# Patient Record
Sex: FEMALE | Race: WHITE | NOT HISPANIC OR LATINO | URBAN - METROPOLITAN AREA
[De-identification: names, ages, dates, MRNs, and addresses within clinical notes are randomized per-mention and may not be internally consistent; named-entity substitution may affect disease eponyms.]

---

## 2021-12-28 ENCOUNTER — OFFICE VISIT (OUTPATIENT)
Dept: URBAN - METROPOLITAN AREA CLINIC 96 | Facility: CLINIC | Age: 56
End: 2021-12-28
Payer: COMMERCIAL

## 2021-12-28 ENCOUNTER — DASHBOARD ENCOUNTERS (OUTPATIENT)
Age: 56
End: 2021-12-28

## 2021-12-28 ENCOUNTER — WEB ENCOUNTER (OUTPATIENT)
Dept: URBAN - METROPOLITAN AREA CLINIC 96 | Facility: CLINIC | Age: 56
End: 2021-12-28

## 2021-12-28 VITALS
BODY MASS INDEX: 22.79 KG/M2 | HEIGHT: 65 IN | SYSTOLIC BLOOD PRESSURE: 128 MMHG | HEART RATE: 62 BPM | WEIGHT: 136.8 LBS | TEMPERATURE: 96 F | DIASTOLIC BLOOD PRESSURE: 87 MMHG

## 2021-12-28 DIAGNOSIS — R19.8 GLOBUS SENSATION: ICD-10-CM

## 2021-12-28 DIAGNOSIS — K44.9 HIATAL HERNIA: ICD-10-CM

## 2021-12-28 DIAGNOSIS — K21.9 GASTROESOPHAGEAL REFLUX DISEASE, UNSPECIFIED WHETHER ESOPHAGITIS PRESENT: ICD-10-CM

## 2021-12-28 PROBLEM — 235595009: Status: ACTIVE | Noted: 2021-12-28

## 2021-12-28 PROCEDURE — 99244 OFF/OP CNSLTJ NEW/EST MOD 40: CPT | Performed by: INTERNAL MEDICINE

## 2021-12-28 PROCEDURE — 99204 OFFICE O/P NEW MOD 45 MIN: CPT | Performed by: INTERNAL MEDICINE

## 2021-12-28 RX ORDER — PANTOPRAZOLE SODIUM 40 MG/1
1 TABLET TABLET, DELAYED RELEASE ORAL ONCE A DAY
Status: ACTIVE | COMMUNITY

## 2021-12-28 NOTE — HPI-TODAY'S VISIT:
Patient was previously seen for screening colonoscopy performed remotely by myself 12/13/2018 with 3 mm polyp noted in the rectum, few small mouth diverticuli in the sigmoid.  Pathology consistent with hyperplastic polyp. Repeat for screening in 10 years (2028) if no signs/sx of concern/change in risk factors.  Patient presents for reflux evaluation since 10/2021. Reports "I feel a ball in my throat when I swallow". NBo prior such sx.  She reports heartburn, reflux. No dysphagia, no odynophagia, no n/v, no melena, no rectal bleeding. No chest pain or shortness or breath. Denies any unintentional weight loss or abdominal pain. No food allergies or diet restrictions. No chronic NSAIDs. She is taking pantoprazole.  No family hx of gastric cancer. Drinks wine nightly, does report several pound weight gain. Walks regularly and play tennis. Stress test negative 12/14/2021 per patient.   CXR and barium swallow which showed hiatal hernia per patient  with primary MD. Trialed omeprazole initially which helped minimally, then changed to pantoprazole which she has been taking since 12/18/2021 which has helped a bit more.  No early satiety.  TTG negative 12/12/2018.

## 2021-12-30 ENCOUNTER — OFFICE VISIT (OUTPATIENT)
Dept: URBAN - METROPOLITAN AREA SURGERY CENTER 18 | Facility: SURGERY CENTER | Age: 56
End: 2021-12-30
Payer: COMMERCIAL

## 2021-12-30 DIAGNOSIS — K21.00 ALKALINE REFLUX ESOPHAGITIS: ICD-10-CM

## 2021-12-30 DIAGNOSIS — K29.60 ADENOPAPILLOMATOSIS GASTRICA: ICD-10-CM

## 2021-12-30 DIAGNOSIS — K31.89 ACQUIRED DEFORMITY OF DUODENUM: ICD-10-CM

## 2021-12-30 PROCEDURE — 43239 EGD BIOPSY SINGLE/MULTIPLE: CPT | Performed by: INTERNAL MEDICINE

## 2021-12-30 PROCEDURE — G8907 PT DOC NO EVENTS ON DISCHARG: HCPCS | Performed by: INTERNAL MEDICINE
